# Patient Record
Sex: FEMALE
[De-identification: names, ages, dates, MRNs, and addresses within clinical notes are randomized per-mention and may not be internally consistent; named-entity substitution may affect disease eponyms.]

---

## 2022-11-29 ENCOUNTER — NURSE TRIAGE (OUTPATIENT)
Dept: OTHER | Facility: CLINIC | Age: 22
End: 2022-11-29

## 2022-11-29 NOTE — TELEPHONE ENCOUNTER
Location of patient: [place]    Subjective: Caller states 2 days ago started coughing up blood with mucus, sore throat     Current Symptoms: Sore throat, cough productive green mucus with blood, today last time coughed was clear with \"specks of blood\"   1st 2 times looked like coughed up blood clot, was entirely bloody size between dime and quarter twice     Onset: 2 days ago     Associated Symptoms: NA    Pain Severity: throat 5/10     Temperature: Denies     What has been tried: Nyquil     LMP:  4 years   Pregnant: No Depo     Recommended disposition: See in Office Today    Care advice provided, patient verbalizes understanding; denies any other questions or concerns; instructed to call back for any new or worsening symptoms. Patient/caller agrees to follow-up with PCP Will go to [facility] if cannot get appt. With PCP today     This triage is a result of a call to [address] [place] [address]. Please do not respond to the triage nurse through this encounter. Any subsequent communication should be directly with the patient.     Reason for Disposition   Coughing up mikala-colored (reddish-brown) or blood-tinged sputum    Protocols used: Cough-ADULT-[place]

## 2023-04-05 LAB
CHLAMYDIA TRACH., AMPLIFIED: NEGATIVE
N. GONORRHEA, AMPLIFIED: NEGATIVE
TRICHOMONAS VAGINALIS: NEGATIVE

## 2023-08-30 LAB — HCG, URINE: NEGATIVE

## 2023-09-14 LAB — HCG, URINE: NEGATIVE

## 2023-09-15 LAB
HERPES SIMPLEX VIRUS 1 IGG: <0.2 INDEX
HERPES SIMPLEX VIRUS 2 IGG: 5.8 INDEX
HSV 1 PCR QUAL, SKIN/MUCOSA: NOT DETECTED
HSV 2 PCR QUAL, SKIN/MUCOSA: DETECTED

## 2023-09-26 PROBLEM — R10.9 ABDOMINAL PAIN: Status: ACTIVE | Noted: 2023-09-26

## 2023-09-26 PROBLEM — R63.4 ABNORMAL WEIGHT LOSS: Status: ACTIVE | Noted: 2023-09-26

## 2023-09-26 PROBLEM — K58.0 IRRITABLE BOWEL SYNDROME WITH DIARRHEA: Status: ACTIVE | Noted: 2023-09-26

## 2023-09-26 PROBLEM — R13.10 ODYNOPHAGIA: Status: ACTIVE | Noted: 2023-09-26

## 2023-09-26 PROBLEM — R63.6 LOW WEIGHT: Status: ACTIVE | Noted: 2023-09-26

## 2023-09-26 PROBLEM — R63.0 POOR APPETITE: Status: ACTIVE | Noted: 2023-09-26

## 2023-09-26 PROBLEM — N89.8 VAGINAL DRYNESS: Status: ACTIVE | Noted: 2023-09-26

## 2023-09-26 PROBLEM — R13.10 DYSPHAGIA: Status: ACTIVE | Noted: 2023-09-26

## 2023-09-26 PROBLEM — R30.0 DYSURIA: Status: ACTIVE | Noted: 2023-09-26

## 2023-09-26 PROBLEM — N93.9 ABNORMAL UTERINE BLEEDING (AUB): Status: ACTIVE | Noted: 2023-09-26

## 2023-09-26 PROBLEM — G47.9 SLEEP DISORDER: Status: ACTIVE | Noted: 2023-09-26

## 2023-09-26 PROBLEM — Z04.9 CONDITION NOT FOUND: Status: ACTIVE | Noted: 2023-09-26

## 2023-09-26 PROBLEM — K20.0 EOSINOPHILIC ESOPHAGITIS: Status: ACTIVE | Noted: 2023-09-26

## 2023-09-26 PROBLEM — K21.9 GASTROESOPHAGEAL REFLUX DISEASE WITHOUT ESOPHAGITIS: Status: ACTIVE | Noted: 2023-09-26

## 2023-09-26 PROBLEM — N73.9 PELVIC INFLAMMATORY DISEASE: Status: ACTIVE | Noted: 2023-09-26

## 2023-09-26 PROBLEM — Z59.41 FOOD INSECURITY: Status: ACTIVE | Noted: 2023-09-26

## 2023-09-26 PROBLEM — N90.89 VULVAL LESION: Status: ACTIVE | Noted: 2023-09-26

## 2023-09-26 PROBLEM — F32.A DEPRESSION: Status: ACTIVE | Noted: 2023-09-26

## 2023-09-26 PROBLEM — F41.9 ANXIETY: Status: ACTIVE | Noted: 2023-09-26

## 2023-09-26 PROBLEM — N94.6 DYSMENORRHEA: Status: ACTIVE | Noted: 2023-09-26

## 2023-09-26 PROBLEM — Z72.51 UNPROTECTED SEX: Status: ACTIVE | Noted: 2023-09-26

## 2023-09-26 RX ORDER — LEVONORGESTREL 1.5 MG/1
1.5 TABLET ORAL ONCE
COMMUNITY

## 2023-09-26 RX ORDER — CHOLECALCIFEROL (VITAMIN D3) 25 MCG
1 TABLET ORAL DAILY
COMMUNITY
Start: 2022-07-05

## 2023-09-26 RX ORDER — VALACYCLOVIR HYDROCHLORIDE 1 G/1
1000 TABLET, FILM COATED ORAL 2 TIMES DAILY
COMMUNITY
End: 2024-04-17 | Stop reason: SDUPTHER

## 2023-09-26 RX ORDER — MEDROXYPROGESTERONE ACETATE 150 MG/ML
150 INJECTION, SUSPENSION INTRAMUSCULAR
COMMUNITY

## 2023-09-26 RX ORDER — ONDANSETRON 4 MG/1
4 TABLET, FILM COATED ORAL EVERY 8 HOURS
COMMUNITY
Start: 2021-09-16

## 2023-09-26 RX ORDER — OMEPRAZOLE 40 MG/1
40 CAPSULE, DELAYED RELEASE ORAL
COMMUNITY

## 2023-12-07 ENCOUNTER — OFFICE VISIT (OUTPATIENT)
Dept: PEDIATRICS | Facility: CLINIC | Age: 23
End: 2023-12-07

## 2023-12-07 VITALS
HEIGHT: 61 IN | BODY MASS INDEX: 21.35 KG/M2 | WEIGHT: 113.1 LBS | HEART RATE: 78 BPM | DIASTOLIC BLOOD PRESSURE: 70 MMHG | RESPIRATION RATE: 22 BRPM | SYSTOLIC BLOOD PRESSURE: 101 MMHG | TEMPERATURE: 97.9 F

## 2023-12-07 DIAGNOSIS — Z30.42 DEPO-PROVERA CONTRACEPTIVE STATUS: Primary | ICD-10-CM

## 2023-12-07 DIAGNOSIS — A60.9 HSV (HERPES SIMPLEX VIRUS) ANOGENITAL INFECTION: ICD-10-CM

## 2023-12-07 PROCEDURE — 2500000004 HC RX 250 GENERAL PHARMACY W/ HCPCS (ALT 636 FOR OP/ED): Mod: SE | Performed by: PEDIATRICS

## 2023-12-07 PROCEDURE — 99213 OFFICE O/P EST LOW 20 MIN: CPT | Performed by: PEDIATRICS

## 2023-12-07 PROCEDURE — 96372 THER/PROPH/DIAG INJ SC/IM: CPT | Performed by: PEDIATRICS

## 2023-12-07 RX ORDER — MEDROXYPROGESTERONE ACETATE 150 MG/ML
150 INJECTION, SUSPENSION INTRAMUSCULAR ONCE
Status: COMPLETED | OUTPATIENT
Start: 2023-12-07 | End: 2023-12-07

## 2023-12-07 RX ADMIN — MEDROXYPROGESTERONE ACETATE 150 MG: 150 INJECTION, SUSPENSION INTRAMUSCULAR at 12:45

## 2023-12-07 ASSESSMENT — PAIN SCALES - GENERAL: PAINLEVEL: 0-NO PAIN

## 2023-12-07 NOTE — PROGRESS NOTES
Assessment:   22 yo here for DMPA Doing well with the method    History of HSV    Plan:   1. Depo-Provera contraceptive status    - medroxyPROGESTERone (Depo-Provera) injection 150 mg  - Follow Up In Pediatrics; in 12-13 weeks    2. HSV (herpes simplex virus) anogenital infection  Discussed coping strategies and medication . Patient knows when to start treatment and currently has enough medication          Subjective:  Linda is a 23 y.o. here for her scheduled DMPA shot    She reports having a HSV outbreak and she started taking the previously prescribed medication for her. She does not want a physical exam for the problem and she does not need STI testing at this visit. She is still very emotional about the infection but knows that she can deal with the problem  Acute concerns:  none      HEADS Exam  Home: lives at home  Education/Employment: works for [employer] and is doing well  Eating: no concerns and does not avoid any foods  Activities: no physical activity at this time  Drugs: denies     Sexuality: with male partners. LSC in OCT  Suicide/Depression: sad about the HSV infection but denies SI/HI  Safety: feels safe      Review of Systems   All other systems reviewed and are negative.       Allergies   Allergen Reactions    Lactose Unknown      Current Outpatient Medications on File Prior to Visit   Medication Sig Dispense Refill    cholecalciferol (Vitamin D-3) 25 MCG (1000 UT) tablet Take 1 tablet (25 mcg) by mouth once daily.      cholecalciferol (Vitamin D-3) 50 mcg (2,000 unit) capsule TAKE 1 CAPSULE DAILY 30 capsule 11    condoms, non-latex, lubricated (CONDOMS - MALE MISC)       levonorgestrel (Plan B) 1.5 mg tablet Take 1 tablet (1.5 mg) by mouth 1 time.      levonorgestrel (Plan B) 1.5 mg tablet TAKE 1 TABLET ONCE 1 tablet 0    medroxyPROGESTERone (Depo-Provera) 150 mg/mL injection Inject 1 mL (150 mg) into the shoulder, thigh, or buttocks every 3 months.      MULTIVITAMIN ORAL Take 1 tablet by mouth once  daily.      omeprazole (PriLOSEC) 40 mg DR capsule Take 1 capsule (40 mg) by mouth once daily in the morning. Take before meals.      ondansetron (Zofran) 4 mg tablet Take 1 tablet (4 mg) by mouth every 8 hours.      valACYclovir (Valtrex) 1 gram tablet Take 1 tablet (1,000 mg) by mouth 2 times a day.       No current facility-administered medications on file prior to visit.          Objective:  Vitals:    12/07/23 1209   BP: 101/70   Pulse: 78   Resp: 22   Temp: 36.6 °C (97.9 °F)     Physical Exam  Constitutional:       Appearance: Normal appearance. She is normal weight.   Cardiovascular:      Rate and Rhythm: Normal rate and regular rhythm.      Pulses: Normal pulses.      Heart sounds: Normal heart sounds.   Pulmonary:      Effort: Pulmonary effort is normal.      Breath sounds: Normal breath sounds.   Abdominal:      General: Abdomen is flat. Bowel sounds are normal.      Palpations: Abdomen is soft.   Neurological:      Mental Status: She is alert.   Psychiatric:         Mood and Affect: Mood normal.           Labs:none today    Briana Wetzel MD

## 2024-03-12 ENCOUNTER — APPOINTMENT (OUTPATIENT)
Dept: PEDIATRICS | Facility: CLINIC | Age: 24
End: 2024-03-12
Payer: COMMERCIAL

## 2024-04-17 ENCOUNTER — LAB (OUTPATIENT)
Dept: LAB | Facility: LAB | Age: 24
End: 2024-04-17
Payer: COMMERCIAL

## 2024-04-17 ENCOUNTER — OFFICE VISIT (OUTPATIENT)
Dept: PEDIATRICS | Facility: CLINIC | Age: 24
End: 2024-04-17
Payer: COMMERCIAL

## 2024-04-17 ENCOUNTER — NUTRITION (OUTPATIENT)
Dept: PEDIATRICS | Facility: CLINIC | Age: 24
End: 2024-04-17

## 2024-04-17 VITALS — BODY MASS INDEX: 21.23 KG/M2 | WEIGHT: 112.43 LBS

## 2024-04-17 VITALS
TEMPERATURE: 97.7 F | RESPIRATION RATE: 18 BRPM | WEIGHT: 112.43 LBS | DIASTOLIC BLOOD PRESSURE: 75 MMHG | SYSTOLIC BLOOD PRESSURE: 114 MMHG | BODY MASS INDEX: 21.23 KG/M2 | HEART RATE: 79 BPM

## 2024-04-17 DIAGNOSIS — Z30.42 DEPO-PROVERA CONTRACEPTIVE STATUS: ICD-10-CM

## 2024-04-17 DIAGNOSIS — Z30.42 ENCOUNTER FOR SURVEILLANCE OF INJECTABLE CONTRACEPTIVE: Primary | ICD-10-CM

## 2024-04-17 DIAGNOSIS — Z30.42 ENCOUNTER FOR SURVEILLANCE OF INJECTABLE CONTRACEPTIVE: ICD-10-CM

## 2024-04-17 DIAGNOSIS — A60.9 HSV (HERPES SIMPLEX VIRUS) ANOGENITAL INFECTION: ICD-10-CM

## 2024-04-17 DIAGNOSIS — Z59.41 FOOD INSECURITY: ICD-10-CM

## 2024-04-17 LAB
ALBUMIN SERPL BCP-MCNC: 4.4 G/DL (ref 3.4–5)
ANION GAP SERPL CALC-SCNC: 11 MMOL/L (ref 10–20)
BUN SERPL-MCNC: 17 MG/DL (ref 6–23)
CALCIUM SERPL-MCNC: 9.9 MG/DL (ref 8.6–10.6)
CHLORIDE SERPL-SCNC: 105 MMOL/L (ref 98–107)
CO2 SERPL-SCNC: 27 MMOL/L (ref 21–32)
CREAT SERPL-MCNC: 0.77 MG/DL (ref 0.5–1.05)
EGFRCR SERPLBLD CKD-EPI 2021: >90 ML/MIN/1.73M*2
ERYTHROCYTE [DISTWIDTH] IN BLOOD BY AUTOMATED COUNT: 12.2 % (ref 11.5–14.5)
GLUCOSE SERPL-MCNC: 76 MG/DL (ref 74–99)
HCT VFR BLD AUTO: 42.7 % (ref 36–46)
HGB BLD-MCNC: 13.3 G/DL (ref 12–16)
MCH RBC QN AUTO: 28.5 PG (ref 26–34)
MCHC RBC AUTO-ENTMCNC: 31.1 G/DL (ref 32–36)
MCV RBC AUTO: 91 FL (ref 80–100)
NRBC BLD-RTO: 0 /100 WBCS (ref 0–0)
PHOSPHATE SERPL-MCNC: 3.5 MG/DL (ref 2.5–4.9)
PLATELET # BLD AUTO: 260 X10*3/UL (ref 150–450)
POTASSIUM SERPL-SCNC: 4.3 MMOL/L (ref 3.5–5.3)
PREGNANCY TEST URINE, POC: NEGATIVE
RBC # BLD AUTO: 4.67 X10*6/UL (ref 4–5.2)
SODIUM SERPL-SCNC: 139 MMOL/L (ref 136–145)
WBC # BLD AUTO: 6.2 X10*3/UL (ref 4.4–11.3)

## 2024-04-17 PROCEDURE — 99214 OFFICE O/P EST MOD 30 MIN: CPT | Mod: GC | Performed by: PEDIATRICS

## 2024-04-17 PROCEDURE — 87661 TRICHOMONAS VAGINALIS AMPLIF: CPT | Mod: 59 | Performed by: PEDIATRICS

## 2024-04-17 PROCEDURE — 80069 RENAL FUNCTION PANEL: CPT

## 2024-04-17 PROCEDURE — 81025 URINE PREGNANCY TEST: CPT | Performed by: PEDIATRICS

## 2024-04-17 PROCEDURE — 87389 HIV-1 AG W/HIV-1&-2 AB AG IA: CPT

## 2024-04-17 PROCEDURE — 86780 TREPONEMA PALLIDUM: CPT

## 2024-04-17 PROCEDURE — 36415 COLL VENOUS BLD VENIPUNCTURE: CPT

## 2024-04-17 PROCEDURE — 99214 OFFICE O/P EST MOD 30 MIN: CPT | Performed by: PEDIATRICS

## 2024-04-17 PROCEDURE — 85027 COMPLETE CBC AUTOMATED: CPT

## 2024-04-17 PROCEDURE — 87800 DETECT AGNT MULT DNA DIREC: CPT | Performed by: PEDIATRICS

## 2024-04-17 RX ORDER — VALACYCLOVIR HYDROCHLORIDE 1 G/1
1000 TABLET, FILM COATED ORAL 2 TIMES DAILY
Qty: 30 TABLET | Refills: 1 | Status: SHIPPED | OUTPATIENT
Start: 2024-04-17

## 2024-04-17 SDOH — ECONOMIC STABILITY - FOOD INSECURITY: FOOD INSECURITY: Z59.41

## 2024-04-17 SDOH — ECONOMIC STABILITY: FOOD INSECURITY: WITHIN THE PAST 12 MONTHS, THE FOOD YOU BOUGHT JUST DIDN'T LAST AND YOU DIDN'T HAVE MONEY TO GET MORE.: OFTEN TRUE

## 2024-04-17 SDOH — ECONOMIC STABILITY: FOOD INSECURITY: WITHIN THE PAST 12 MONTHS, YOU WORRIED THAT YOUR FOOD WOULD RUN OUT BEFORE YOU GOT MONEY TO BUY MORE.: OFTEN TRUE

## 2024-04-17 ASSESSMENT — PAIN SCALES - GENERAL: PAINLEVEL: 0-NO PAIN

## 2024-04-17 NOTE — PROGRESS NOTES
Nutrition Assessment     Reason for Visit:  Linda Briones is a 23 y.o. female who presents for Nutrition Counseling (Weight gain and healthy eating )    Anthropometrics:  Weight: 51 kg (112 lb 7 oz)    Food And Nutrient Intake:  Food and Nutrient History: Linda is a 23 y.o female who presented to clinic for a well visit, during visit Dr. Wetzel referred for nutrition education regarding patients wish to gain weight. Linda reported eating 1 meal a day typically during the evening after work (more recently having [employer], chicken wings), living with her aunt, and sometimes not being able to get ingredients for an entire meal, Food for Life referral requested and ordered by Dr. Wetzel and handouts were given. Linda also reported trying to eat breakfast more often because she knows she has the time before work. Typically makes eggs, toast, nava, sausage, and/or fruit. Let Linda lead the conversation and adding education throughout, discussed quick protein packed breakfast options for days she doesn’t have as much time. Also talked about packing a lunch for at work to eat half way through her shift. Gave Linda handouts and she noted understanding and going to start making changes.     Nutrition Diagnosis   Patient has Nutrition Diagnosis: Yes  Diagnosis Status (1): New  Nutrition Diagnosis 1: Inadequate oral intake  Related to (1): decreased appetite  As Evidenced by (1): Linda reporting only eating 1 meal a day typically in the evening    Nutrition Interventions/Recommendations   Food and Nutrition Delivery  Meals & Snacks: Modify schedule of foods/fluids  Goals: Try to incorperate breakfast and lunch into your routine, try to include a protein source, carbohydrate, fruit and/or vegetable    Nutrition Monitoring and Evaluation   Food/Nutrient Related History Monitoring  Monitoring and Evaluation Plan: Energy intake  Criteria: Will monitor Linda's consistency in consuming 3 meals a day at next visit    Time Spent  Prep  time on day of patient encounter: 5 minutes  Time spent directly with patient, family or caregiver: 15 minutes  Additional Time Spent on Patient Care Activities: 0 minutes  Documentation Time: 15 minutes  Other Time Spent: 0 minutes  Total: 35 minutes

## 2024-04-17 NOTE — PATIENT INSTRUCTIONS
It was great to see you today!    We will see you back in 1 week to repeat a urine pregnancy test and get your depo shot. In the meantime, please abstain from having sex.     We will also complete STI testing and some additional screening labs to look at your blood counts and kidneys today. Please get these drawn at the lab before you leave.    We will send a refill of the valacyclovir to your pharmacy. See you next week!

## 2024-04-17 NOTE — PROGRESS NOTES
Subjective     HPI:   Linda Briones is a 23 y.o. woman who is here today for depo and follow up genital HSV infection.     Had a recent herpes flare but was different than before. Started about 3 weeks ago, lasted for 1 week, started taking valtrex about 2 days after seeing bumps. Took it for two days and stopped. Bumps got smaller and so she stopped taking meds. Initially had small bumps on labia and one larger raised bump on her leg - quarter sized. All resolved at about the same time. Has had 3 herpes outbreaks. This was not painful compared to the first outbreak. Took valtrex earlier in the outbreak this time compared to second outbreak.     Had unprotected sex 1 week ago.     General Health: would like to gain weight, feels like she has a hard time keeping weight on. Interested in referral to nutrition.     HEADSS:   - Home: Lives at home with aunt and younger cousin for the past year.   - Education: in school for phlebotomy - almost done!  - Activities: works at [employer] - part time  - Diet: eats protein, struggles to eat enough veggies, eats fruit. Eats 1 meal a day - misses breakfast and eats chips for lunch, eats dinner when she gets home  - Dental: not regularly, brushes teeth  - Sleep: sleeps okay, wakes up at night, having knee pain and wakes her at night  - Suicidality: Denies SI  - Menses: LMP March 25th, lasted 1 week, it was a full period. On depo was not having much bleeding at all. Before used to spot toward end of shot.  -Safety: Discussed seatbelts and guns.    Gender Identity: female  Sexual history: last sexually active 1 week ago unprotected. They were previously in a relationship, possibly getting back together. 2 partners in the last year.  Attracted to male partners.   Substance use:  alcohol occasionally, 2x per week. Does not smoke anything      ROS: negative for HA, dizziness, changes in vision or hearing, runny nose, cough, chest pain, shortness of breath, abdominal pain, n/v, diarrhea,  constipation, problems with urination, changes in vaginal discharge, no burning/stinging with urination, no numbness/tingling/swelling of extremities       Objective      Vitals:   Visit Vitals  /75   Pulse 79   Temp 36.5 °C (97.7 °F)   Resp 18   Wt 51 kg (112 lb 7 oz)   BMI 21.23 kg/m²   Smoking Status Never Assessed   BSA 1.48 m²        BP percentile: Growth %ile SmartLinks can only be used for patients less than 20 years old.    Height percentile: Facility age limit for growth %bryan is 20 years.    Weight percentile: Facility age limit for growth %bryan is 20 years.    BMI percentile: Facility age limit for growth %bryan is 20 years.      Physical exam:   Constitutional: awake and alert, resting comfortably in NAD  Eyes: No scleral icterus or conjuctival injection.  ENMT: MMM  Head/Neck: NC/AT  Respiratory/Thorax: Breathing comfortably. No retractions or accessory muscle use. Good air entry into all lung fields. CTAB, no wheezes or crackles  Cardiovascular: RRR, no m/r/g  Gastrointestinal: soft, NT/ND, no mass, no organomegaly.  No rebound or guarding. No CVA tenderness  : no lesions, masses, erythema, abnormal discharge. No lesions over the upper thigh.  Extremities: warm and well perfused, no LE edema  Neurological: MAEE  Psychological: Mood and affect appropriate for age    Hearing/Vision:  No results found.         Assessment/Plan   Linda Briones is an 23 y.o. old woman presenting for their follow-up for depo and STI testing. She last had unprotected sex one week ago, discussed that pregnancy test today could be negative and still inconclusive. Recommend that she abstain from sex, return in 1 week for repeat urine pregnancy test and depo if still negative then. We will also complete STI testing for her given recent unprotected sex and changes in HSV symptoms. Her HSV outbreak is cleared up, we will send a refill of the valtrex for her. We will also complete additional screening labs today.    #genital  HSV infection  - Recent outbreak fully cleared  - Counseled patient that HSV can be transmitted during an outbreak  - Needs refill of valtrex, sent to pharmacy    #Contraception  - Would like to continue with depo  - urine hCG negative, however, recent unprotected sex  - Return 1 week for repeat and depo shot    #STI screening  - HIV, syphilis, GC/CT, trich; recent unprotected sex and change in lesion during HSV outbreak    #Nutrition and low weight concern  - Saw nutritionist in office  - Referral sent if pt would like to make an additional appt    #Health maintenance  - CBC, RFP    Active Problems  Diagnoses and all orders for this visit:  Encounter for surveillance of injectable contraceptive  -     CBC; Future  -     Renal function panel; Future  Depo-Provera contraceptive status  -     Follow Up In Pediatrics  -     POCT urine pregnancy  -     C. trachomatis + N. gonorrhoeae, Amplified  -     Trichomonas vaginalis, Amplified  -     HIV 1/2 Antigen/Antibody Screen with Reflex to Confirmation; Future  -     Syphilis Screen with Reflex; Future  -     Referral to Nutrition Services; Future  HSV (herpes simplex virus) anogenital infection  -     valACYclovir (Valtrex) 1 gram tablet; Take 1 tablet (1,000 mg) by mouth 2 times a day.      Follow up in 1 week for repeat pregnancy test and depo      Neeta Zendejas, MS4 was present at the visit , history and physical exam

## 2024-04-18 LAB
C TRACH RRNA SPEC QL NAA+PROBE: NEGATIVE
HIV 1+2 AB+HIV1 P24 AG SERPL QL IA: NONREACTIVE
N GONORRHOEA DNA SPEC QL PROBE+SIG AMP: NEGATIVE
T VAGINALIS RRNA SPEC QL NAA+PROBE: NEGATIVE
TREPONEMA PALLIDUM IGG+IGM AB [PRESENCE] IN SERUM OR PLASMA BY IMMUNOASSAY: NONREACTIVE

## 2024-04-24 ENCOUNTER — TELEPHONE (OUTPATIENT)
Dept: PEDIATRICS | Facility: CLINIC | Age: 24
End: 2024-04-24

## 2024-04-25 ENCOUNTER — APPOINTMENT (OUTPATIENT)
Dept: PEDIATRICS | Facility: CLINIC | Age: 24
End: 2024-04-25
Payer: COMMERCIAL

## 2024-05-01 ENCOUNTER — OFFICE VISIT (OUTPATIENT)
Dept: PEDIATRICS | Facility: CLINIC | Age: 24
End: 2024-05-01
Payer: COMMERCIAL

## 2024-05-01 VITALS
WEIGHT: 111.11 LBS | DIASTOLIC BLOOD PRESSURE: 74 MMHG | BODY MASS INDEX: 20.98 KG/M2 | RESPIRATION RATE: 18 BRPM | HEIGHT: 61 IN | TEMPERATURE: 97.5 F | HEART RATE: 96 BPM | SYSTOLIC BLOOD PRESSURE: 105 MMHG

## 2024-05-01 DIAGNOSIS — Z30.42 ENCOUNTER FOR SURVEILLANCE OF INJECTABLE CONTRACEPTIVE: Primary | ICD-10-CM

## 2024-05-01 LAB — PREGNANCY TEST URINE, POC: NEGATIVE

## 2024-05-01 PROCEDURE — 2500000004 HC RX 250 GENERAL PHARMACY W/ HCPCS (ALT 636 FOR OP/ED): Mod: SE | Performed by: PEDIATRICS

## 2024-05-01 PROCEDURE — 96372 THER/PROPH/DIAG INJ SC/IM: CPT | Performed by: PEDIATRICS

## 2024-05-01 PROCEDURE — 81025 URINE PREGNANCY TEST: CPT | Performed by: PEDIATRICS

## 2024-05-01 PROCEDURE — 99213 OFFICE O/P EST LOW 20 MIN: CPT | Performed by: PEDIATRICS

## 2024-05-01 RX ORDER — MEDROXYPROGESTERONE ACETATE 150 MG/ML
150 INJECTION, SUSPENSION INTRAMUSCULAR ONCE
Status: COMPLETED | OUTPATIENT
Start: 2024-05-01 | End: 2024-05-01

## 2024-05-01 RX ADMIN — MEDROXYPROGESTERONE ACETATE 150 MG: 150 INJECTION, SUSPENSION INTRAMUSCULAR at 14:43

## 2024-05-01 ASSESSMENT — PAIN SCALES - GENERAL: PAINLEVEL: 0-NO PAIN

## 2024-05-01 NOTE — PROGRESS NOTES
Adolescent Medicine Visit  Assessment:  Linda is a 23 y.o. female with history of using DMPA as her method of contraception who presents for DMPA shot today  Doing well in general. Will continue depo      Plan:   1. Encounter for surveillance of injectable contraceptive  Patient wants to continue the depo today. We discussed alternative options and recommended to consider changing to a different method about 1 year before she will consider pregnancy    We will follow up in 12-13 weeks  Recommended to take the vitamin d daily  - POCT urine pregnancy  - medroxyPROGESTERone (Depo-Provera) injection 150 mg          Subjective:  Linda is a 23 y.o. female who presents for DMPA shot. She considered other options but would like to stay with the depo at this time. Patient is doing well with the shot LSC was about 2 weeks ag, before her last visit. She is generally using condoms. Denies substance use/abuse  She is working and happy with her job.Still lives at home  Feels safe    Had a PAP last AUG    Acute concerns:  Wants to continue with depo          Review of Systems   All other systems reviewed and are negative.       Allergies   Allergen Reactions    Lactose Unknown      Current Outpatient Medications on File Prior to Visit   Medication Sig Dispense Refill    cholecalciferol (Vitamin D-3) 25 MCG (1000 UT) tablet Take 1 tablet (25 mcg) by mouth once daily.      cholecalciferol (Vitamin D-3) 50 mcg (2,000 unit) capsule TAKE 1 CAPSULE DAILY 30 capsule 11    condoms, non-latex, lubricated (CONDOMS - MALE MISC)       levonorgestrel (Plan B) 1.5 mg tablet Take 1 tablet (1.5 mg) by mouth 1 time.      levonorgestrel (Plan B) 1.5 mg tablet TAKE 1 TABLET ONCE 1 tablet 0    medroxyPROGESTERone (Depo-Provera) 150 mg/mL injection Inject 1 mL (150 mg) into the shoulder, thigh, or buttocks every 3 months.      MULTIVITAMIN ORAL Take 1 tablet by mouth once daily.      omeprazole (PriLOSEC) 40 mg DR capsule Take 1 capsule (40 mg) by mouth  once daily in the morning. Take before meals.      ondansetron (Zofran) 4 mg tablet Take 1 tablet (4 mg) by mouth every 8 hours.      valACYclovir (Valtrex) 1 gram tablet Take 1 tablet (1,000 mg) by mouth 2 times a day. 30 tablet 1     No current facility-administered medications on file prior to visit.          Objective:  Vitals:    05/01/24 1408   BP: 105/74   Pulse: 96   Resp: 18   Temp: 36.4 °C (97.5 °F)     Physical Exam  Constitutional:       Appearance: Normal appearance. She is normal weight.   HENT:      Mouth/Throat:      Mouth: Mucous membranes are moist.   Eyes:      Conjunctiva/sclera: Conjunctivae normal.   Cardiovascular:      Rate and Rhythm: Normal rate and regular rhythm.      Pulses: Normal pulses.      Heart sounds: Normal heart sounds.   Pulmonary:      Effort: Pulmonary effort is normal.      Breath sounds: Normal breath sounds.   Abdominal:      General: Abdomen is flat. Bowel sounds are normal.      Palpations: Abdomen is soft.   Skin:     General: Skin is warm.   Neurological:      Mental Status: She is alert. Mental status is at baseline.   Psychiatric:         Mood and Affect: Mood normal.           Labs:negative pregnancy test today    Briana Wetzel MD

## 2025-02-25 ENCOUNTER — OFFICE VISIT (OUTPATIENT)
Dept: PEDIATRICS | Facility: CLINIC | Age: 25
End: 2025-02-25
Payer: COMMERCIAL

## 2025-02-25 VITALS
RESPIRATION RATE: 16 BRPM | TEMPERATURE: 98.2 F | HEART RATE: 93 BPM | DIASTOLIC BLOOD PRESSURE: 60 MMHG | BODY MASS INDEX: 21.39 KG/M2 | WEIGHT: 112.43 LBS | SYSTOLIC BLOOD PRESSURE: 92 MMHG

## 2025-02-25 DIAGNOSIS — A64 STI (SEXUALLY TRANSMITTED INFECTION): Primary | ICD-10-CM

## 2025-02-25 PROBLEM — A60.9 ANOGENITAL HERPES SIMPLEX VIRUS (HSV) INFECTION: Status: ACTIVE | Noted: 2023-12-07

## 2025-02-25 PROBLEM — N93.9 ABNORMAL UTERINE BLEEDING (AUB): Status: RESOLVED | Noted: 2023-09-26 | Resolved: 2025-02-25

## 2025-02-25 PROBLEM — R30.0 DYSURIA: Status: RESOLVED | Noted: 2023-09-26 | Resolved: 2025-02-25

## 2025-02-25 PROBLEM — N94.6 DYSMENORRHEA: Status: RESOLVED | Noted: 2023-09-26 | Resolved: 2025-02-25

## 2025-02-25 PROBLEM — R10.9 ABDOMINAL PAIN: Status: RESOLVED | Noted: 2023-09-26 | Resolved: 2025-02-25

## 2025-02-25 PROBLEM — E73.9 LACTOSE INTOLERANCE: Status: ACTIVE | Noted: 2025-02-25

## 2025-02-25 ASSESSMENT — PAIN SCALES - GENERAL: PAINLEVEL_OUTOF10: 5

## 2025-02-25 NOTE — PROGRESS NOTES
No chief complaint on file.       Subjective   HPI: Linda Briones is a 24 y.o. female with PMH of HSV 2 genital infection diagnosed in 9/2023 now presenting to acute care with concern for new oral HSV infection.    Patient says that for the last few days she has noticed that her throat feels itchy and it doesn't get better with water. She also noted a small rough area of skin on her left side upper lip. There is no pain or burning of the lips. No visible ulcers on the lips. She denies any other symptoms including fever, cough, nausea, vomiting. Also no genital symptoms today.     Patient says that she is not currently being sexually active with anyone. She is concerned about possibility of spreading HSV to others especially since the partner who gave it to her did not inform her of his infection prior to their relationship. She was using condoms when sexually active. Also previously on Depo Provera for birth control but no longer taking. Not interested in other forms of birth control today.      Past Medical History:   Past Medical History:   Diagnosis Date    HSV infection 09/2023    Other specified disorders of nose and nasal sinuses 09/03/2014    Rhinorrhea    Pregnancy with inconclusive fetal viability, not applicable or unspecified ([facility]) 08/18/2021    Pregnancy of unknown anatomic location      Past Surgical History: No past surgical history on file.   Medications:    Current Outpatient Medications   Medication Instructions    cholecalciferol (Vitamin D-3) 25 MCG (1000 UT) tablet 1 tablet, oral, Daily    condoms, non-latex, lubricated (CONDOMS - MALE MISC) miscellaneous    levonorgestrel (Plan B) 1.5 mg tablet TAKE 1 TABLET ONCE    levonorgestrel (PLAN B) 1.5 mg, oral, Once    medroxyPROGESTERone (DEPO-PROVERA) 150 mg, intramuscular, Every 3 months    MULTIVITAMIN ORAL 1 tablet, oral, Daily    omeprazole (PRILOSEC) 40 mg, oral, Daily before breakfast    ondansetron (ZOFRAN) 4 mg, oral, Every 8 hours     valACYclovir (VALTREX) 1,000 mg, oral, 2 times daily      Allergies:   Allergies   Allergen Reactions    Lactose Unknown      Immunizations:   Immunization History   Administered Date(s) Administered    DTaP vaccine, pediatric  (INFANRIX) 12/11/2006    DTaP, Unspecified 2002, 01/23/2003, 03/27/2003, 11/25/2003    Flu vaccine, trivalent, preservative free, age 6 months and greater (Fluarix/Fluzone/Flulaval) 10/13/2009, 09/27/2011    HPV, Quadrivalent 09/03/2014, 03/15/2016    Hepatitis A vaccine, pediatric/adolescent (HAVRIX, VAQTA) 03/15/2016, 08/04/2017    Hepatitis B vaccine, 19 yrs and under (RECOMBIVAX, ENGERIX) 2002, 2002, 06/17/2003    HiB, unspecified 2002, 01/23/2003, 03/27/2003, 11/25/2003    Influenza, seasonal, injectable 12/11/2006    MMR and varicella combined vaccine, subcutaneous (PROQUAD) 12/11/2006    MMR vaccine, subcutaneous (MMR II) 11/25/2003, 12/11/2006    Meningococcal ACWY vaccine (MENVEO) 06/28/2019    Meningococcal ACWY-D (Menactra) 4-valent conjugate vaccine 08/19/2013    Meningococcal B vaccine (BEXSERO) 06/28/2019, 10/08/2020    Pneumococcal Conjugate PCV 7 2002, 01/23/2003, 03/27/2003    Poliovirus vaccine, subcutaneous (IPOL) 2002, 01/23/2003, 02/17/2004, 12/11/2006    Tdap vaccine, age 7 year and older (BOOSTRIX, ADACEL) 08/19/2013, 09/14/2023    Varicella vaccine, subcutaneous (VARIVAX) 11/25/2003, 12/11/2006     Family History: denies family history pertinent to presenting problem  Family History   Problem Relation Name Age of Onset    Allergic rhinitis Mother      Asthma Mother      Other (pure hypercholesterolemia) Maternal Grandfather      Brain cancer Paternal Grandmother        /School: school       Objective   There were no vitals filed for this visit.    Physical Exam  Constitutional:       General: She is not in acute distress.     Appearance: Normal appearance.   HENT:      Head: Normocephalic.      Right Ear: External ear normal.       Left Ear: External ear normal.      Nose: Nose normal.      Mouth/Throat:      Mouth: Mucous membranes are moist.      Comments: No visible ulcers or sores on the lips or around the mouth. Skin of lips and mouth looks healthy without any break in skin.   Eyes:      Extraocular Movements: Extraocular movements intact.      Conjunctiva/sclera: Conjunctivae normal.   Pulmonary:      Effort: Pulmonary effort is normal.      Breath sounds: Normal breath sounds.   Musculoskeletal:         General: Normal range of motion.      Cervical back: Normal range of motion.   Skin:     General: Skin is warm and dry.      Findings: No lesion or rash.   Neurological:      General: No focal deficit present.      Mental Status: She is alert. Mental status is at baseline.   Psychiatric:         Mood and Affect: Mood normal.         Thought Content: Thought content normal.       No results found for this or any previous visit (from the past 96 hours).    No results found.      Assessment   Assessment and Plan:   Linda Briones is a 24 y.o. female with PMH of HSV 2 genital infection diagnosed in 9/2023 now presenting to acute care with concern for new oral HSV infection.     On arrival Linda Briones was HDS, well appearing, and in no acute distress. No lesions, sores, ulcerations visualized on the lips or mouth to indicate a new oral HSV infection. Discussed this with patient and informed her that it is highly unlikely that she has a new oral herpes infection without any visible vesicular oral lesions.    Also counseled on prevention of HSV spread including using condoms regularly, abstaining from sex when having sores or outbreaks, and also offered to start patient on prophylactic valacyclovir. Patient uninterested in starting Valacyclovir today, but understanding that it is an option in the future if desired.     Patient did request STI testing which was sent today, will call patient to fu on results. Phone number in chart correct.  Patient also with access to mychart. No patient proxys with access to mychart.     Diagnoses and all orders for this visit:  STI (sexually transmitted infection)  -     C. trachomatis / N. gonorrhoeae, Amplified, Urogenital  -     HIV 1/2 Antigen/Antibody Screen with Reflex to Confirmation; Future  -     Syphilis Screen with Reflex; Future  -     Trichomonas vaginalis, Amplified    Pt seen and discussed with Dr. Jax Garcia MD  Pediatrics, PGY-1

## 2025-02-25 NOTE — PATIENT INSTRUCTIONS
"It was a pleasure to see you in clinic today!     Today we sent some tests to see if you have any infections. These results will come to my chart. I will also call you to discuss the results.     Before you leave:  Please stop by the lab on the first floor of this building ([facility]) to have your lab work completed.    If you need non-urgent healthcare in between appointments:  - We have a nurse advice line available 24/7- just call us at [phone].   - We also have daily sick visits (\"same day sick visit\") and walk-in clinic available Monday through Friday. Walk in or call at [phone].  Please let us help you avoid the Emergency Room if it is not an emergency! We want to help care for your child!    "

## 2025-02-26 LAB
C TRACH RRNA SPEC QL NAA+PROBE: NOT DETECTED
N GONORRHOEA RRNA SPEC QL NAA+PROBE: NOT DETECTED
QUEST GC CT AMPLIFIED (ALWAYS MESSAGE): NORMAL
T VAGINALIS RRNA SPEC QL NAA+PROBE: NOT DETECTED

## 2025-02-27 LAB
HIV 1+2 AB+HIV1 P24 AG SERPL QL IA: NORMAL
T PALLIDUM AB SER QL IA: NEGATIVE

## 2025-05-19 ENCOUNTER — APPOINTMENT (OUTPATIENT)
Dept: PEDIATRICS | Facility: CLINIC | Age: 25
End: 2025-05-19
Payer: COMMERCIAL